# Patient Record
(demographics unavailable — no encounter records)

---

## 2024-10-28 NOTE — ASSESSMENT
[FreeTextEntry1] : # Abnormal vaginal bleeding  - F/u next week with OBGYN for planned hysteroscopy for diagnostic purposes.      #Iron deficiency anemia/ Low ferrritin secondary to blood loss  Discussed physiology of iron and erythropoiesis. Discussed iron tablet vs liquid vs intravenous blood loss likely from heavy abnormal vaginal bleeding, - Has been compliant with PO iron and continues with GUY, ferritin ; 7 therefore will start IV venofer 200 mg IV x 5 doses.  - Consider repeating further IV infusions until Ferritin is above 50     Follow up 3 weeks after last appointment   Labs next appointment: Anemia panel ( CBC, iron, B12, ferritin, retic ect)

## 2024-10-28 NOTE — HISTORY OF PRESENT ILLNESS
[de-identified] :  Patient is a pleasant  36 year F  with PMHX of Anal fissures, external hemorrhoids, idiopathic inctracranial HTN   Presents to the office today with Anemia   Referred by Fadumo Gunter    Onset of symptoms: Since childhood. Managed on Iron po pills which provoke severe constipation. SE have led to hemorrhoids and anal fissures.    + Heavy menses since august, 2024  Visted the ED last week for severe vaginal bleeding requiring 1 unit of PRBC's. Denies any iron infusions. Has a planned hysteroscopy November 5th, 2024 with Obybn.  Associated symptoms include: headaches, dizziness, SOB with exertion, increase fatigue. Sleeping more than normal   Still taking iron pills once a day only. Also Transenamix acid po TID and provera (to help stop th evaginal bleeding)   Also taking Beet juice, carrot juice  Taking motrin 400-600 mg 3 x a day as needed for vaginal cramping.   Diet: eats meat, poultry, green leafy vegetables, lentils ect.   Ethinicity: Shawn.           Denies any Fever, chills, hot or cold intolerance, unint wt loss/ wt gain, change in nutrition, altered bowel habits, chest pain, epistaxis, hematuria, bruising, cold intolerance, altered bowel habits, N/V, diarrhea, BRBPR, black stool ect.     Denies any herbal remedies, teas, trauma and surgical procedure       Menarche onset: 13 yrs old   Heavy menses: Yes. Usually last 6 days long. Heaviest day is the first 1-3 days with clots. Regular periods.    Live births: Denies   Miscarriages: Denies    LMP: Currently on her menses. Since august 14th, 2024 vaginal bleeding has not stopped: intermittently spotting.    Mammo:  Denies    Pap: Normal pap. Last one was 2022   No prior EGD's nor colonoscopes.       Pertinent labs reviewed:  - 07/2024: Hgb; 10.3, Hct; 34.4, MCV; 79.6, Plt; 522, Iron: 27, TIBC; 447, % Sat: 6, Ferritin: 7.     Family Hx:  - P cousins x 2 with sickle cell disease.   - M Grandmother with brain cancer ( dx age 77) Denies any other GUY, renal disorders nor malignancies in the family ect.   Social HX:  Social drinker ( 3-4 x drinks monthly) Denes any Smoking nor illicit drug use,      Pertient prior workup include - 10/2024 Pelvic US: 1 Uterine fibroid - Hemoglobin electorphoresis reviewed by Dr. Irvin and neg for sickle cell disease.     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

## 2024-10-28 NOTE — REVIEW OF SYSTEMS
[Diarrhea: Grade 0] : Diarrhea: Grade 0 [Negative] : Allergic/Immunologic [Fatigue] : fatigue [SOB on Exertion] : shortness of breath during exertion [Shortness Of Breath] : no shortness of breath [Wheezing] : no wheezing [Cough] : no cough

## 2024-12-17 NOTE — HISTORY OF PRESENT ILLNESS
[de-identified] :  Patient is a pleasant  36 year F  with PMHX of Anal fissures, external hemorrhoids, idiopathic inctracranial HTN   Presents to the office today with Anemia   Referred by Fadumo Gunter    Onset of symptoms: Since childhood. Managed on Iron po pills which provoke severe constipation. SE have led to hemorrhoids and anal fissures.    + Heavy menses since august, 2024  Visted the ED last week for severe vaginal bleeding requiring 1 unit of PRBC's. Denies any iron infusions. Has a planned hysteroscopy November 5th, 2024 with Obybn.  Associated symptoms include: headaches, dizziness, SOB with exertion, increase fatigue. Sleeping more than normal   Still taking iron pills once a day only. Also Transenamix acid po TID and provera (to help stop th evaginal bleeding)   Also taking Beet juice, carrot juice  Taking motrin 400-600 mg 3 x a day as needed for vaginal cramping.   Diet: eats meat, poultry, green leafy vegetables, lentils ect.   Ethinicity: Shawn.      Denies any Fever, chills, hot or cold intolerance, unint wt loss/ wt gain, change in nutrition, altered bowel habits, chest pain, epistaxis, hematuria, bruising, cold intolerance, altered bowel habits, N/V, diarrhea, BRBPR, black stool ect.     Denies any herbal remedies, teas, trauma and surgical procedure     Menarche onset: 13 yrs old   Heavy menses: Yes. Usually last 6 days long. Heaviest day is the first 1-3 days with clots. Regular periods.    Live births: Denies   Miscarriages: Denies    LMP: Currently on her menses. Since august 14th, 2024 vaginal bleeding has not stopped: intermittently spotting.    Mammo:  Denies    Pap: Normal pap. Last one was 2022   No prior EGD's nor colonoscopes.       Pertinent labs reviewed:  - 07/2024: Hgb; 10.3, Hct; 34.4, MCV; 79.6, Plt; 522, Iron: 27, TIBC; 447, % Sat: 6, Ferritin: 7.     Family Hx:  - P cousins x 2 with sickle cell disease.   - M Grandmother with brain cancer ( dx age 77) Denies any other GUY, renal disorders nor malignancies in the family ect.   Social HX:  Social drinker ( 3-4 x drinks monthly) Denes any Smoking nor illicit drug use,      Pertient prior workup include - 10/2024 Pelvic US: 1 Uterine fibroid - Hemoglobin electorphoresis reviewed by Dr. Irvin and neg for sickle cell disease.     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 Last infusion was approx 2 weeks ago  - Vaginal bleeding had stopped for over 1 month after using provera and TXA. Bleeding returned 12/13/2024; having clots now. Now on birth control to help with her menses.  - [facility] and [facility] found a uterine polyp; was left; unable to be removed as per patient.  - Overall feeling well today. No longer craving ice and having more energy.  - + intermittent headaches since getting her period 4 days ago.  - Trying to get pregnant; only on BC for a short time to stop the bleeds.

## 2024-12-17 NOTE — HISTORY OF PRESENT ILLNESS
[de-identified] :  Patient is a pleasant  36 year F  with PMHX of Anal fissures, external hemorrhoids, idiopathic inctracranial HTN   Presents to the office today with Anemia   Referred by Fadumo Gunter    Onset of symptoms: Since childhood. Managed on Iron po pills which provoke severe constipation. SE have led to hemorrhoids and anal fissures.    + Heavy menses since august, 2024  Visted the ED last week for severe vaginal bleeding requiring 1 unit of PRBC's. Denies any iron infusions. Has a planned hysteroscopy November 5th, 2024 with Obybn.  Associated symptoms include: headaches, dizziness, SOB with exertion, increase fatigue. Sleeping more than normal   Still taking iron pills once a day only. Also Transenamix acid po TID and provera (to help stop th evaginal bleeding)   Also taking Beet juice, carrot juice  Taking motrin 400-600 mg 3 x a day as needed for vaginal cramping.   Diet: eats meat, poultry, green leafy vegetables, lentils ect.   Ethinicity: Shawn.      Denies any Fever, chills, hot or cold intolerance, unint wt loss/ wt gain, change in nutrition, altered bowel habits, chest pain, epistaxis, hematuria, bruising, cold intolerance, altered bowel habits, N/V, diarrhea, BRBPR, black stool ect.     Denies any herbal remedies, teas, trauma and surgical procedure     Menarche onset: 13 yrs old   Heavy menses: Yes. Usually last 6 days long. Heaviest day is the first 1-3 days with clots. Regular periods.    Live births: Denies   Miscarriages: Denies    LMP: Currently on her menses. Since august 14th, 2024 vaginal bleeding has not stopped: intermittently spotting.    Mammo:  Denies    Pap: Normal pap. Last one was 2022   No prior EGD's nor colonoscopes.       Pertinent labs reviewed:  - 07/2024: Hgb; 10.3, Hct; 34.4, MCV; 79.6, Plt; 522, Iron: 27, TIBC; 447, % Sat: 6, Ferritin: 7.     Family Hx:  - P cousins x 2 with sickle cell disease.   - M Grandmother with brain cancer ( dx age 77) Denies any other GUY, renal disorders nor malignancies in the family ect.   Social HX:  Social drinker ( 3-4 x drinks monthly) Denes any Smoking nor illicit drug use,      Pertient prior workup include - 10/2024 Pelvic US: 1 Uterine fibroid - Hemoglobin electorphoresis reviewed by Dr. Irvin and neg for sickle cell disease.     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 Last infusion was approx 2 weeks ago  - Vaginal bleeding had stopped for over 1 month after using provera and TXA. Bleeding returned 12/13/2024; having clots now. Now on birth control to help with her menses.  - [facility] [place] and they found a uterine polyp; was left; unable to be removed as per patient.  - Overall feeling well today. No longer craving ice and having more energy.  - + intermittent headaches since getting her period 4 days ago.  - Trying to get pregnant; only on BC for a short time to stop the bleeds.

## 2024-12-17 NOTE — REVIEW OF SYSTEMS
[Fatigue] : fatigue [SOB on Exertion] : shortness of breath during exertion [Diarrhea: Grade 0] : Diarrhea: Grade 0 [Negative] : Allergic/Immunologic [Shortness Of Breath] : no shortness of breath [Wheezing] : no wheezing [Cough] : no cough

## 2024-12-17 NOTE — ASSESSMENT
[FreeTextEntry1] : # Abnormal vaginal bleeding  - Hysteroscopy revealed uterine polyp; not removed as per patient.  - Now on Birth control for the last 4 days. Bleeding has subsided - If bleeding returns or patient comes off birth control then consider TXA in the future to stop bleeding episodes    #Iron deficiency anemia/ Low ferritin secondary to blood loss  Discussed physiology of iron and erythropoiesis. Discussed iron tablet vs liquid vs intravenous blood loss likely from heavy abnormal vaginal bleeding, - 12/2024--> Has completed 5/5 Iron infusions--- Last OV ferritin was; 7   if ferritin is below 50---- Order Venofer 200 mg Iv x 5 doses  - If ferritin above 50 than C/W daily oral Iron    OV in 2 months    Labs next appointment: Anemia panel ( CBC, iron, B12, ferritin, retic ect)

## 2025-01-13 NOTE — HISTORY OF PRESENT ILLNESS
[FreeTextEntry1] : Riddhi is a 37 y/o female being seen for consultation, medication refill  H/O fissure using Nifedipine H/O hemorrhoids   ***Left voicemail for HPI

## 2025-02-18 NOTE — HISTORY OF PRESENT ILLNESS
[de-identified] :  Patient is a pleasant  36 year F  with PMHX of Anal fissures, external hemorrhoids, idiopathic inctracranial HTN   Presents to the office today with Anemia   Referred by Fadumo Gunter    Onset of symptoms: Since childhood. Managed on Iron po pills which provoke severe constipation. SE have led to hemorrhoids and anal fissures.    + Heavy menses since august, 2024  Visted the ED last week for severe vaginal bleeding requiring 1 unit of PRBC's. Denies any iron infusions. Has a planned hysteroscopy November 5th, 2024 with Obybn.  Associated symptoms include: headaches, dizziness, SOB with exertion, increase fatigue. Sleeping more than normal   Still taking iron pills once a day only. Also Transenamix acid po TID and provera (to help stop th evaginal bleeding)   Also taking Beet juice, carrot juice  Taking motrin 400-600 mg 3 x a day as needed for vaginal cramping.   Diet: eats meat, poultry, green leafy vegetables, lentils ect.   Ethinicity: Shawn.      Denies any Fever, chills, hot or cold intolerance, unint wt loss/ wt gain, change in nutrition, altered bowel habits, chest pain, epistaxis, hematuria, bruising, cold intolerance, altered bowel habits, N/V, diarrhea, BRBPR, black stool ect.     Denies any herbal remedies, teas, trauma and surgical procedure     Menarche onset: 13 yrs old   Heavy menses: Yes. Usually last 6 days long. Heaviest day is the first 1-3 days with clots. Regular periods.    Live births: Denies   Miscarriages: Denies    LMP: Currently on her menses. Since august 14th, 2024 vaginal bleeding has not stopped: intermittently spotting.    Mammo:  Denies    Pap: Normal pap. Last one was 2022   No prior EGD's nor colonoscopes.       Pertinent labs reviewed:  - 07/2024: Hgb; 10.3, Hct; 34.4, MCV; 79.6, Plt; 522, Iron: 27, TIBC; 447, % Sat: 6, Ferritin: 7.     Family Hx:  - P cousins x 2 with sickle cell disease.   - M Grandmother with brain cancer ( dx age 77) Denies any other GUY, renal disorders nor malignancies in the family ect.   Social HX:  Social drinker ( 3-4 x drinks monthly) Denes any Smoking nor illicit drug use,      Pertient prior workup include - 10/2024 Pelvic US: 1 Uterine fibroid - Hemoglobin electorphoresis reviewed by Dr. Irvin and neg for sickle cell disease.     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 denies pain or discomfort. had [id number]. last infusion last week. Patient start nextstellis, stopped provera. Nextstellis has esterol a plant based estrogen. Hasn't needed TXA, sertraline  12/17/2024 - Presents to the office today for follow up; Has completed 5/5 Venofer 200 mg IV infusions. Last infusion was approx 2 weeks ago  - Vaginal bleeding had stopped for over 1 month after using provera and TXA. Bleeding returned 12/13/2024; having clots now. Now on birth control to help with her menses.  - Saw obgyn ([facility]) and they found a uterine polyp; was left; unable to be removed as per patient.  - Overall feeling well today. No longer craving ice and having more energy.  - + intermittent headaches since getting her period 4 days ago.  - Trying to get pregnant; only on BC for a short time to stop the bleeds.

## 2025-02-18 NOTE — HISTORY OF PRESENT ILLNESS
[de-identified] :  Patient is a pleasant  36 year F  with PMHX of Anal fissures, external hemorrhoids, idiopathic inctracranial HTN   Presents to the office today with Anemia   Referred by Fadumo Gunter    Onset of symptoms: Since childhood. Managed on Iron po pills which provoke severe constipation. SE have led to hemorrhoids and anal fissures.    + Heavy menses since august, 2024  Visted the ED last week for severe vaginal bleeding requiring 1 unit of PRBC's. Denies any iron infusions. Has a planned hysteroscopy November 5th, 2024 with Obybn.  Associated symptoms include: headaches, dizziness, SOB with exertion, increase fatigue. Sleeping more than normal   Still taking iron pills once a day only. Also Transenamix acid po TID and provera (to help stop th evaginal bleeding)   Also taking Beet juice, carrot juice  Taking motrin 400-600 mg 3 x a day as needed for vaginal cramping.   Diet: eats meat, poultry, green leafy vegetables, lentils ect.   Ethinicity: Shawn.      Denies any Fever, chills, hot or cold intolerance, unint wt loss/ wt gain, change in nutrition, altered bowel habits, chest pain, epistaxis, hematuria, bruising, cold intolerance, altered bowel habits, N/V, diarrhea, BRBPR, black stool ect.     Denies any herbal remedies, teas, trauma and surgical procedure     Menarche onset: 13 yrs old   Heavy menses: Yes. Usually last 6 days long. Heaviest day is the first 1-3 days with clots. Regular periods.    Live births: Denies   Miscarriages: Denies    LMP: Currently on her menses. Since august 14th, 2024 vaginal bleeding has not stopped: intermittently spotting.    Mammo:  Denies    Pap: Normal pap. Last one was 2022   No prior EGD's nor colonoscopes.       Pertinent labs reviewed:  - 07/2024: Hgb; 10.3, Hct; 34.4, MCV; 79.6, Plt; 522, Iron: 27, TIBC; 447, % Sat: 6, Ferritin: 7.     Family Hx:  - P cousins x 2 with sickle cell disease.   - M Grandmother with brain cancer ( dx age 77) Denies any other GUY, renal disorders nor malignancies in the family ect.   Social HX:  Social drinker ( 3-4 x drinks monthly) Denes any Smoking nor illicit drug use,      Pertient prior workup include - 10/2024 Pelvic US: 1 Uterine fibroid - Hemoglobin electorphoresis reviewed by Dr. Irvin and neg for sickle cell disease.     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 denies pain or discomfort. had [id number]. last infusion last week. Patient start nextstellis, stopped provera. Nextstellis has esterol a plant based estrogen. Hasn't needed TXA, sertraline  12/17/2024 - Presents to the office today for follow up; Has completed 5/5 Venofer 200 mg IV infusions. Last infusion was approx 2 weeks ago  - Vaginal bleeding had stopped for over 1 month after using provera and TXA. Bleeding returned 12/13/2024; having clots now. Now on birth control to help with her menses.  - Saw obgyn ([facility]) and they found a uterine polyp; was left; unable to be removed as per patient.  - Overall feeling well today. No longer craving ice and having more energy.  - + intermittent headaches since getting her period 4 days ago.  - Trying to get pregnant; only on BC for a short time to stop the bleeds.

## 2025-02-18 NOTE — ASSESSMENT
[FreeTextEntry1] : # Abnormal vaginal bleeding  -improved, now no nextstellis a plant based estrogen/progesterone - Hysteroscopy revealed uterine polyp; not removed as per patient.  - Now on Birth control for the last 4 days. Bleeding has subsided - If bleeding returns or patient comes off birth control then consider TXA in the future to stop bleeding episodes    #Iron deficiency anemia/ Low ferritin secondary to blood loss  Discussed physiology of iron and erythropoiesis. Discussed iron tablet vs liquid vs intravenous blood loss likely from heavy abnormal vaginal bleeding, - Feb 2025--> Has completed an additional 5/5 Iron infusions--- Repeat ferritin today(last venofer was last week, may ferritin may not be reflective, will have patient return in 4 weeks with RAGHAVENDRA Singh)  if ferritin is below 50---- Order Venofer 200 mg Iv x 5 doses  - If ferritin above 50 than C/W daily oral Iron    OV in 2 months    Labs next appointment: Anemia panel ( CBC, iron, B12, ferritin, retic ect)

## 2025-02-18 NOTE — HISTORY OF PRESENT ILLNESS
[de-identified] :  Patient is a pleasant  36 year F  with PMHX of Anal fissures, external hemorrhoids, idiopathic inctracranial HTN   Presents to the office today with Anemia   Referred by Fadumo Gunter    Onset of symptoms: Since childhood. Managed on Iron po pills which provoke severe constipation. SE have led to hemorrhoids and anal fissures.    + Heavy menses since august, 2024  Visted the ED last week for severe vaginal bleeding requiring 1 unit of PRBC's. Denies any iron infusions. Has a planned hysteroscopy November 5th, 2024 with Obybn.  Associated symptoms include: headaches, dizziness, SOB with exertion, increase fatigue. Sleeping more than normal   Still taking iron pills once a day only. Also Transenamix acid po TID and provera (to help stop th evaginal bleeding)   Also taking Beet juice, carrot juice  Taking motrin 400-600 mg 3 x a day as needed for vaginal cramping.   Diet: eats meat, poultry, green leafy vegetables, lentils ect.   Ethinicity: Shawn.      Denies any Fever, chills, hot or cold intolerance, unint wt loss/ wt gain, change in nutrition, altered bowel habits, chest pain, epistaxis, hematuria, bruising, cold intolerance, altered bowel habits, N/V, diarrhea, BRBPR, black stool ect.     Denies any herbal remedies, teas, trauma and surgical procedure     Menarche onset: 13 yrs old   Heavy menses: Yes. Usually last 6 days long. Heaviest day is the first 1-3 days with clots. Regular periods.    Live births: Denies   Miscarriages: Denies    LMP: Currently on her menses. Since august 14th, 2024 vaginal bleeding has not stopped: intermittently spotting.    Mammo:  Denies    Pap: Normal pap. Last one was 2022   No prior EGD's nor colonoscopes.       Pertinent labs reviewed:  - 07/2024: Hgb; 10.3, Hct; 34.4, MCV; 79.6, Plt; 522, Iron: 27, TIBC; 447, % Sat: 6, Ferritin: 7.     Family Hx:  - P cousins x 2 with sickle cell disease.   - M Grandmother with brain cancer ( dx age 77) Denies any other GUY, renal disorders nor malignancies in the family ect.   Social HX:  Social drinker ( 3-4 x drinks monthly) Denes any Smoking nor illicit drug use,      Pertient prior workup include - 10/2024 Pelvic US: 1 Uterine fibroid - Hemoglobin electorphoresis reviewed by Dr. Irvin and neg for sickle cell disease.     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                                                    [de-identified] : 2/18/2025 overall feels good. denies pain or discomfort. had [id number]. last infusion last week. Patient start nextstellis, stopped provera. Nextstellis has esterol a plant based estrogen. Hasn't needed TXA, sertraline  12/17/2024 - Presents to the office today for follow up; Has completed 5/5 Venofer 200 mg IV infusions. Last infusion was approx 2 weeks ago  - Vaginal bleeding had stopped for over 1 month after using provera and TXA. Bleeding returned 12/13/2024; having clots now. Now on birth control to help with her menses.  - Saw obgyn ([facility]) and they found a uterine polyp; was left; unable to be removed as per patient.  - Overall feeling well today. No longer craving ice and having more energy.  - + intermittent headaches since getting her period 4 days ago.  - Trying to get pregnant; only on BC for a short time to stop the bleeds.

## 2025-02-18 NOTE — BEGINNING OF VISIT
[0] : 2) Feeling down, depressed, or hopeless: Not at all (0) [Pain Scale: ___] : On a scale of 1-10, today the patient's pain is a(n) [unfilled]. [Never] : Never [Patient/Caregiver not ready to engage] : Patient/Caregiver not ready to engage

## 2025-03-18 NOTE — REVIEW OF SYSTEMS
[Fatigue] : fatigue [Shortness Of Breath] : no shortness of breath [Cough] : no cough [Wheezing] : no wheezing [SOB on Exertion] : shortness of breath during exertion [Diarrhea: Grade 0] : Diarrhea: Grade 0 [Negative] : Respiratory

## 2025-03-18 NOTE — REVIEW OF SYSTEMS
[Fatigue] : fatigue [Shortness Of Breath] : no shortness of breath [Wheezing] : no wheezing [Cough] : no cough [SOB on Exertion] : shortness of breath during exertion [Diarrhea: Grade 0] : Diarrhea: Grade 0 [Negative] : Respiratory

## 2025-03-18 NOTE — REVIEW OF SYSTEMS
CC:  Requesting to talk to someone regarding healthcare directive forms       Caller has 4 forms and they are all different - Not sure which is best / correct to use       Pt tele# [phone]      A/P:  > I will message clinic directly to have staff follow up with you         [Fatigue] : fatigue [Shortness Of Breath] : no shortness of breath [Cough] : no cough [Wheezing] : no wheezing [SOB on Exertion] : shortness of breath during exertion [Diarrhea: Grade 0] : Diarrhea: Grade 0 [Negative] : Respiratory

## 2025-03-20 NOTE — HISTORY OF PRESENT ILLNESS
[de-identified] :  Patient is a pleasant  36 year F  with PMHX of Anal fissures, external hemorrhoids, idiopathic inctracranial HTN   Presents to the office today with Anemia   Referred by Fadumo Gunter    Onset of symptoms: Since childhood. Managed on Iron po pills which provoke severe constipation. SE have led to hemorrhoids and anal fissures.    + Heavy menses since august, 2024  Visted the ED last week for severe vaginal bleeding requiring 1 unit of PRBC's. Denies any iron infusions. Has a planned hysteroscopy November 5th, 2024 with Obybn.  Associated symptoms include: headaches, dizziness, SOB with exertion, increase fatigue. Sleeping more than normal   Still taking iron pills once a day only. Also Transenamix acid po TID and provera (to help stop th evaginal bleeding)   Also taking Beet juice, carrot juice  Taking motrin 400-600 mg 3 x a day as needed for vaginal cramping.   Diet: eats meat, poultry, green leafy vegetables, lentils ect.   Ethinicity: Shawn.      Denies any Fever, chills, hot or cold intolerance, unint wt loss/ wt gain, change in nutrition, altered bowel habits, chest pain, epistaxis, hematuria, bruising, cold intolerance, altered bowel habits, N/V, diarrhea, BRBPR, black stool ect.     Denies any herbal remedies, teas, trauma and surgical procedure     Menarche onset: 13 yrs old   Heavy menses: Yes. Usually last 6 days long. Heaviest day is the first 1-3 days with clots. Regular periods.    Live births: Denies   Miscarriages: Denies    LMP: Currently on her menses. Since august 14th, 2024 vaginal bleeding has not stopped: intermittently spotting.    Mammo:  Denies    Pap: Normal pap. Last one was 2022   No prior EGD's nor colonoscopes.       Pertinent labs reviewed:  - 07/2024: Hgb; 10.3, Hct; 34.4, MCV; 79.6, Plt; 522, Iron: 27, TIBC; 447, % Sat: 6, Ferritin: 7.     Family Hx:  - P cousins x 2 with sickle cell disease.   - M Grandmother with brain cancer ( dx age 77) Denies any other GUY, renal disorders nor malignancies in the family ect.   Social HX:  Social drinker ( 3-4 x drinks monthly) Denes any Smoking nor illicit drug use,      Pertient prior workup include - 10/2024 Pelvic US: 1 Uterine fibroid - Hemoglobin electorphoresis reviewed by Dr. Irvin and neg for sickle cell disease.     Medications reviewed Works: RN at [employer]                                                    [de-identified] : 2/18/2025 overall feels good. denies pain or discomfort. had [id number]. last infusion last week. Patient start nextstellis, stopped provera. Nextstellis has esterol a plant based estrogen. Hasn't needed TXA, sertraline  12/17/2024 - Presents to the office today for follow up; Has completed 5/5 Venofer 200 mg IV infusions. Last infusion was approx 2 weeks ago  - Vaginal bleeding had stopped for over 1 month after using provera and TXA. Bleeding returned 12/13/2024; having clots now. Now on birth control to help with her menses.  - Saw obgyn (Dr. Russell [facility]) and they found a uterine polyp; was left; unable to be removed as per patient.  - Overall feeling well today. No longer craving ice and having more energy.  - + intermittent headaches since getting her period 4 days ago.  - Trying to get pregnant; only on BC for a short time to stop the bleeds.   03/18/2025 - Here for follow up - Has now received venofer 200  mg infusions . A total of 10  - Labs today to trend iron panel and anemia levels  - LMP: 03/09/2025-03/17/2025. large clots day 4-5.  Sill on Nextstellis  birth control but would like to come off May 2025 to try to conceive.  - Denies Fever, chills, unint wt loss/ wt gain, dizziness, headaches, chest pain, SOB, Chronic cough, reflux, heartburn, dysphagia, early satiety, food avoidance, abd pain, bloating, cramping, N/V/C/D, BRBPR, black stool, ec.

## 2025-03-20 NOTE — REASON FOR VISIT
Location of patient: [address] villanueva from [facility] at [facility] with [employer]; Patient with Red Flag Complaint requesting to establish care with Sabrina . Subjective: Caller states \"Extreme fatigue\"     Current Symptoms: Insomnia, spot baldness, extreme fatigue-wants to get thyroid checked. Was in shower, pulled about 2 inches spot of hair top right of scalp- last night-hair already growing back. Work from home since [employer] started, thought fatigue was attributed to that. Been feeling fatigued for about a year, insomnia has worsen past 2 weeks. Eyes puffy due to fatigue. Severe fear of needles. Onset: 1 year ago; intermittent, worsening    Associated Symptoms: NA    Pain Severity: Denies    Temperature: Denies    What has been tried: Vitamins-help a little     LMP: NA Pregnant: NA    Recommended disposition: See in Office Within 2 Weeks, caller is not established, RN advised if unable to be seen within recommended time frame- UCC/Walkin as back up. Care advice provided, patient verbalizes understanding; denies any other questions or concerns; instructed to call back for any new or worsening symptoms. Patient/Caller agrees with recommended disposition; writer provided warm transfer to [facility] at [facility] for appointment scheduling    Attention Provider: Thank you for allowing me to participate in the care of your patient. The patient was connected to triage in response to information provided to the [facility]. Please do not respond through this encounter as the response is not directed to a shared pool.       Reason for Disposition   Fatigue is a chronic symptom (recurrent or ongoing AND present > 4 weeks)    Protocols used: Weakness (Generalized) and Fatigue-ADULT-[place] [Initial Consultation] : an initial consultation for [FreeTextEntry2] : GUY

## 2025-03-20 NOTE — HISTORY OF PRESENT ILLNESS
[de-identified] :  Patient is a pleasant  36 year F  with PMHX of Anal fissures, external hemorrhoids, idiopathic inctracranial HTN   Presents to the office today with Anemia   Referred by Fadumo Gunter    Onset of symptoms: Since childhood. Managed on Iron po pills which provoke severe constipation. SE have led to hemorrhoids and anal fissures.    + Heavy menses since august, 2024  Visted the ED last week for severe vaginal bleeding requiring 1 unit of PRBC's. Denies any iron infusions. Has a planned hysteroscopy November 5th, 2024 with Obybn.  Associated symptoms include: headaches, dizziness, SOB with exertion, increase fatigue. Sleeping more than normal   Still taking iron pills once a day only. Also Transenamix acid po TID and provera (to help stop th evaginal bleeding)   Also taking Beet juice, carrot juice  Taking motrin 400-600 mg 3 x a day as needed for vaginal cramping.   Diet: eats meat, poultry, green leafy vegetables, lentils ect.   Ethinicity: Shawn.      Denies any Fever, chills, hot or cold intolerance, unint wt loss/ wt gain, change in nutrition, altered bowel habits, chest pain, epistaxis, hematuria, bruising, cold intolerance, altered bowel habits, N/V, diarrhea, BRBPR, black stool ect.     Denies any herbal remedies, teas, trauma and surgical procedure     Menarche onset: 13 yrs old   Heavy menses: Yes. Usually last 6 days long. Heaviest day is the first 1-3 days with clots. Regular periods.    Live births: Denies   Miscarriages: Denies    LMP: Currently on her menses. Since august 14th, 2024 vaginal bleeding has not stopped: intermittently spotting.    Mammo:  Denies    Pap: Normal pap. Last one was 2022   No prior EGD's nor colonoscopes.       Pertinent labs reviewed:  - 07/2024: Hgb; 10.3, Hct; 34.4, MCV; 79.6, Plt; 522, Iron: 27, TIBC; 447, % Sat: 6, Ferritin: 7.     Family Hx:  - P cousins x 2 with sickle cell disease.   - M Grandmother with brain cancer ( dx age 77) Denies any other GUY, renal disorders nor malignancies in the family ect.   Social HX:  Social drinker ( 3-4 x drinks monthly) Denes any Smoking nor illicit drug use,      Pertient prior workup include - 10/2024 Pelvic US: 1 Uterine fibroid - Hemoglobin electorphoresis reviewed by Dr. Irvin and neg for sickle cell disease.     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 denies pain or discomfort. had [id number]. last infusion last week. Patient start nextstellis, stopped provera. Nextstellis has esterol a plant based estrogen. Hasn't needed TXA, sertraline  12/17/2024 - Presents to the office today for follow up; Has completed 5/5 Venofer 200 mg IV infusions. Last infusion was approx 2 weeks ago  - Vaginal bleeding had stopped for over 1 month after using provera and TXA. Bleeding returned 12/13/2024; having clots now. Now on birth control to help with her menses.  - Saw obgyn (Dr. Russell [facility]) and they found a uterine polyp; was left; unable to be removed as per patient.  - Overall feeling well today. No longer craving ice and having more energy.  - + intermittent headaches since getting her period 4 days ago.  - Trying to get pregnant; only on BC for a short time to stop the bleeds.   03/18/2025 - Here for follow up - Has now received venofer 200  mg infusions . A total of 10  - Labs today to trend iron panel and anemia levels  - LMP: 03/09/2025-03/17/2025. large clots day 4-5.  Sill on Nextstellis  birth control but would like to come off May 2025 to try to conceive.  - Denies Fever, chills, unint wt loss/ wt gain, dizziness, headaches, chest pain, SOB, Chronic cough, reflux, heartburn, dysphagia, early satiety, food avoidance, abd pain, bloating, cramping, N/V/C/D, BRBPR, black stool, ec.

## 2025-03-20 NOTE — ASSESSMENT
[FreeTextEntry1] : # Abnormal vaginal bleeding  -improved, now no nextstellis a plant based estrogen/progesterone - Hysteroscopy revealed uterine polyp; not removed as per patient.  - bleeding has subsided  - If bleeding returns or patient comes off birth control then consider TXA in the future to stop bleeding episodes    #Iron deficiency anemia/ Low ferritin secondary to blood loss  Discussed physiology of iron and erythropoiesis. Discussed iron tablet vs liquid vs intravenous blood loss likely from heavy abnormal vaginal bleeding, - March, 2025--> Has completed an additional 10/10 Iron infusions--- Repeat ferritin today  if ferritin is below 50---- Consider further  Venofer 200 mg IV  - If ferritin above 50 than C/W daily oral Iron    OV in 4 months  Labs next appointment: Anemia panel ( CBC, iron, B12, ferritin, retic ect)

## 2025-03-20 NOTE — HISTORY OF PRESENT ILLNESS
[de-identified] :  Patient is a pleasant  36 year F  with PMHX of Anal fissures, external hemorrhoids, idiopathic inctracranial HTN   Presents to the office today with Anemia   Referred by Fadumo Gunter    Onset of symptoms: Since childhood. Managed on Iron po pills which provoke severe constipation. SE have led to hemorrhoids and anal fissures.    + Heavy menses since august, 2024  Visted the ED last week for severe vaginal bleeding requiring 1 unit of PRBC's. Denies any iron infusions. Has a planned hysteroscopy November 5th, 2024 with Obybn.  Associated symptoms include: headaches, dizziness, SOB with exertion, increase fatigue. Sleeping more than normal   Still taking iron pills once a day only. Also Transenamix acid po TID and provera (to help stop th evaginal bleeding)   Also taking Beet juice, carrot juice  Taking motrin 400-600 mg 3 x a day as needed for vaginal cramping.   Diet: eats meat, poultry, green leafy vegetables, lentils ect.   Ethinicity: Shawn.      Denies any Fever, chills, hot or cold intolerance, unint wt loss/ wt gain, change in nutrition, altered bowel habits, chest pain, epistaxis, hematuria, bruising, cold intolerance, altered bowel habits, N/V, diarrhea, BRBPR, black stool ect.     Denies any herbal remedies, teas, trauma and surgical procedure     Menarche onset: 13 yrs old   Heavy menses: Yes. Usually last 6 days long. Heaviest day is the first 1-3 days with clots. Regular periods.    Live births: Denies   Miscarriages: Denies    LMP: Currently on her menses. Since august 14th, 2024 vaginal bleeding has not stopped: intermittently spotting.    Mammo:  Denies    Pap: Normal pap. Last one was 2022   No prior EGD's nor colonoscopes.       Pertinent labs reviewed:  - 07/2024: Hgb; 10.3, Hct; 34.4, MCV; 79.6, Plt; 522, Iron: 27, TIBC; 447, % Sat: 6, Ferritin: 7.     Family Hx:  - P cousins x 2 with sickle cell disease.   - M Grandmother with brain cancer ( dx age 77) Denies any other GUY, renal disorders nor malignancies in the family ect.   Social HX:  Social drinker ( 3-4 x drinks monthly) Denes any Smoking nor illicit drug use,      Pertient prior workup include - 10/2024 Pelvic US: 1 Uterine fibroid - Hemoglobin electorphoresis reviewed by Dr. Irvin and neg for sickle cell disease.     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 denies pain or discomfort. had [id number]. last infusion last week. Patient start nextstellis, stopped provera. Nextstellis has esterol a plant based estrogen. Hasn't needed TXA, sertraline  12/17/2024 - Presents to the office today for follow up; Has completed 5/5 Venofer 200 mg IV infusions. Last infusion was approx 2 weeks ago  - Vaginal bleeding had stopped for over 1 month after using provera and TXA. Bleeding returned 12/13/2024; having clots now. Now on birth control to help with her menses.  - Saw obgyn (Dr. Russell [facility]) and they found a uterine polyp; was left; unable to be removed as per patient.  - Overall feeling well today. No longer craving ice and having more energy.  - + intermittent headaches since getting her period 4 days ago.  - Trying to get pregnant; only on BC for a short time to stop the bleeds.   03/18/2025 - Here for follow up - Has now received venofer 200  mg infusions . A total of 10  - Labs today to trend iron panel and anemia levels  - LMP: 03/09/2025-03/17/2025. large clots day 4-5.  Sill on Nextstellis  birth control but would like to come off May 2025 to try to conceive.  - Denies Fever, chills, unint wt loss/ wt gain, dizziness, headaches, chest pain, SOB, Chronic cough, reflux, heartburn, dysphagia, early satiety, food avoidance, abd pain, bloating, cramping, N/V/C/D, BRBPR, black stool, ec.